# Patient Record
(demographics unavailable — no encounter records)

---

## 2017-08-20 NOTE — REP
Chest x-ray:  Two views.

 

History:  Chest pain.

 

Comparison study:  January 7, 2017.

 

Findings:  EKG monitoring electrodes overlie the chest.  The patient is status

post prior median sternotomy.  Three or four  metallic densities overlie the

heart, on the frontal and lateral radiograph suggestive of intracardiac device.

CT study shows mitral valve replacement.  There is a hazy opacity on today's

chest x-ray overlying the anterior end of the first and second ribs.  This is not

apparent on January 7, 2017 chest x-ray or chest CT.  A new infiltrate cannot be

excluded.  The density is somewhat nodular or rounded.  Recommend followup chest

x-ray and possibly chest CT scanning.  No other infiltrate is seen.

 

Impression:

 

Hazy 4 cm opacity in the left apex.  Infiltrate versus other lesion.  Follow-up

chest x-ray recommended.  Chest CT may be warranted.  Otherwise no active

disease. Prior sternotomy and mitral valve replacement.

 

 

Signed by

Osmel Barraza MD 08/20/2017 08:49 A

## 2017-08-20 NOTE — ECGEPIP
Stationary ECG Study

                           [facility] - ED

                                       

                                       Test Date:    2017

Pat Name:     CJ HUNG        Department:   

Patient ID:   [id number]                 Room:         -

Gender:       F                        Technician:   de

:          1964               Requested By: LEONOR DESHPANDE

Order Number: [id number]     Reading MD:   Maddie Moser

                                 Measurements

Intervals                              Axis          

Rate:         78                       P:            30

AZ:           226                      QRS:          -11

QRSD:         149                      T:            114

QT:           441                                    

QTc:          505                                    

                           Interpretive Statements

SINUS RHYTHM WITH FIRST DEGREE AV BLOCK WITH OCCASIONAL VENTRICULAR PREMATURE

 

COMPLEXES

LEFT BUNDLE BRANCH BLOCK

SIMILAR 22:17

Electronically Signed On 2017 21:34:50 EDT by Maddie Moser

## 2017-08-20 NOTE — ECGEPIP
Stationary ECG Study

                           [facility] - ED

                                       

                                       Test Date:    2017

Pat Name:     CJ HUNG        Department:   

Patient ID:   [id number]                 Room:         -

Gender:       F                        Technician:   de

:          1964               Requested By: LEONOR DESHPANDE

Order Number: [id number]     Reading MD:   Maddie Moser

                                 Measurements

Intervals                              Axis          

Rate:         79                       P:            44

ND:           224                      QRS:          -5

QRSD:         152                      T:            155

QT:           441                                    

QTc:          507                                    

                           Interpretive Statements

SINUS RHYTHM WITH FIRST DEGREE AV BLOCK

LEFT BUNDLE BRANCH BLOCK

SIMILAR 17

Electronically Signed On 2017 21:33:42 EDT by Maddie Moser

## 2017-08-21 NOTE — ED PDOC
Post-Departure Follow-Up


certitied letter sent to pt re formal read of cxr fr fu mlg Lundborg-Gray,Maja MD Aug 21, 2017 13:39

## 2017-10-23 NOTE — ECGEPIP
Stationary ECG Study

                           [facility] - ED

                                       

                                       Test Date:    2017-10-23

Pat Name:     CJ WOLF             Department:   

Patient ID:   [id number]                 Room:         -

Gender:       F                        Technician:   estela APONTEB:          1964               Requested By: LEONOR DESHPANDE

Order Number: [id number]     Reading MD:   Maddie Moser

                                 Measurements

Intervals                              Axis          

Rate:         71                       P:            -67

WI:           165                      QRS:          -12

QRSD:         160                      T:            121

QT:           479                                    

QTc:          521                                    

                           Interpretive Statements

SINUS RHYTHM WITH SINUS ARRHYTHMIA

LEFT BUNDLE BRANCH BLOCK

NO PRIOR FOR COMPARISON

Electronically Signed On 10- 21:19:44 EDT by Maddie Moser

## 2017-10-24 NOTE — REP
V/Q SCAN:

 

Following the intravenous administration of 5.5 mCi of technetium 99m tagged MAA

and the inhalation of 1 mCi of technetium 99m DTPA aerosol, multiple images of

the lungs are obtained in various projections.  There is cardiomegaly.  There is

mild elevation of the right hemidiaphragm.  Subsegmental matching ventilation and

perfusion defects are seen in both lung bases.  No areas of significant V/Q

mismatch are seen.

 

IMPRESSION:

 

Low probability of pulmonary embolism.

 

 

Signed by

Carlton Preston MD 10/25/2017 04:26 P

## 2017-10-24 NOTE — IPN
DATE:  10/24/2017

 

Patient admitted overnight with persistent chest pain, sharp, left-sided,

radiating up to neck, not relieved with nitroglycerin.  On and off for the past

one month or so.  Denies any fevers, chills, coughing, shortness of breath.  Her

baseline morbidly obese.

 

VITAL SIGNS:  Temperature 96.3, pulse 60, respirations 16, blood pressure 194/46,

pulse oximetry 98% on room air.

 

LABORATORY:

WBC 9.3, hemoglobin and hematocrit (H and H) 13.1/39.2, platelets 127.

 

Chemistry:  Sodium 140, potassium 3.6, chloride 105, bicarbonate 25, BUN 25,

creatinine 1.87, magnesium 1.88.

 

Cardiac enzymes negative times three.

 

Ventilation-perfusion (V/Q) scan negative.

 

PHYSICAL EXAMINATION:

GENERAL:  Patient morbidly obese, alert and oriented times three, in no acute

distress.

HEENT:  Normocephalic, atraumatic.

PULMONARY:  Distant breath sounds.  Bilateral clear to auscultation.

CARDIAC:  Regular S1, S2.

ABDOMEN:  Soft, nontender.

EXTREMITIES:   No clubbing, cyanosis or edema.

 

ASSESSMENT AND PLAN:

This is a 53-year-old female patient with underlying medical history of coronary

artery disease with hypertrophic obstructive cardiomyopathy.  Patient has had

stent placement multiple times in the past, as well as septal myotomy, morbid

obesity, hypothyroidism, anxiety, hypertension, depression, restless leg

syndrome, obstructive sleep apnea, not compliant with continuous positive airway

pressure (CPAP).  Patient presented with chest pain on and off for the past

month.

 

PROBLEMS:

1.  Chest pain for the past one month.  Cardiac enzyme negative times three.

Consulted cardiology, Dr. Finley and V/Q scan has been negative.  Chest x-ray

appreciated.  Telemetry monitoring as per Dr. Finley.  There are no cardiac

reasons to keep the patient as inpatient at this point.  Continue aspirin and

Plavix.  Recommend outpatient followup.  Home safety evaluation.  Pain medication

as prescribed.  Continue Imdur.

 

2.  Obstructive sleep apnea.  Patient poorly compliant.  Not using continuous

positive airway pressure (CPAP).  Will monitor closely.  Obstructive sleep apnea

(KELIN) protocol.

 

3.  Depression/anxiety.  Continue current medication.

 

4.  Seasonal allergies.  Continue current medication.

 

5.  Morbid obesity.  Complicating care.

 

6.  Diabetes.  Insulin sliding scale per protocol.

 

7.  Hypothyroidism.  Continue Synthroid.

 

8.  Hypertension.  Continue metoprolol and Imdur.

 

9.  Gastroesophageal reflux disease (GERD).  Continue current medication.

 

10.  Restless leg syndrome.  Continue current medication.

 

11.  Chronic kidney disease (CKD).  Monitor BUN and creatinine.  Currently

improved.

 

12.  Deep venous thrombosis (DVT) prophylaxis.  Heparin subcutaneously.

 

DISPOSITION PLANNING:  Likely discharge over the next 24 hours.  Home safety

evaluation.

## 2017-10-24 NOTE — REP
PA and lateral chest:

 

There are no comparisons.

 

There is cardiomegaly.  There are sternotomy wires.

 

There are no pleural effusions.  There are no infiltrates.  The sean,

mediastinum, and bony thorax are unremarkable.

 

Impression:

 

Cardiomegaly.

 

 

Signed by

Carlton Dejesus MD 10/24/2017 07:28 A

## 2017-10-24 NOTE — ECHO
DATE OF PROCEDURE: 10/23/2017

 

REFERRING PHYSICIAN:  Dr. Edgar Souza in the emergency room.

 

INDICATION"  Chest pain, heart murmur.

 

The patient measures 155 cm and weighs 143 kg.  She has a history of 
hypertrophic

cardiomyopathy, history of mitral valve replacement with bioprosthesis.

 

DIMENSIONS:

IVS 1.6

LV 4.4

LVPW:  1.4

LA 4.9

Aorta 2.8

 

E velocity on mitral inflow 142 cm/sec. A velocity 131 cm/sec.  E prime 3.0

cm/sec.  E prime lateral 6.1 cm/sec.

 

FINDINGS:

The patient is in sinus rhythm.  Quality of the study is very limited

corresponding to patient's body habitus.  Left ventricle is of normal size and

overall normal contractility, I estimate ejection fraction around 60%.  There is

a septal wall motion abnormality, likely related to prior open heart surgery.

I cannot rule out wall motion abnormality involving the apex, but it was poorly

seen.  Right ventricle does not appear enlarged.  Left atrium is severely

enlarged. Right atrium was poorly visualized.  Aortic valve was poorly seen.  It

appears at least mildly sclerotic but mobility is grossly preserved.  There is a

bioprosthesis in mitral position.  The visualization was rather limited.  There

appear to be some mobile echo densities adjacent to the valve in the left

ventricle, which most likely represent remnants of the chordal apparatus.  I

cannot completely rule out that this might be vegetations, but I consider it

unlikely. Tricuspid and pulmonic valves were poorly visualized but no gross

abnormalities were seen.  No pericardial effusion is noted.  Inferior vena cava

is dilated but has some collapse with respiration, indicative of likely elevated

central venous pressure.  Aortic root is normal, aortic arch and abdominal aorta

were not seen.

 

Doppler interrogation of aortic valve reveals trace insufficiency and trivial

stenosis, mean gradient was only 7 mmHg.  Peak gradient across the mitral

bioprosthesis is 9 and mean 5, which are numbers that are minimally elevated.  I

do not appreciate any insufficiency of the valve.  There is trace tricuspid

insufficiency, but unfortunately quality of TR jet was not sufficient to

adequately estimate pulmonary artery pressure.  Pulmonic valve also appears

grossly competent based on limited views.

 

Mitral inflow pattern and tissue Doppler imaging of mitral annulus reveal at

least grade 2 diastolic dysfunction.  Unfortunately, the accuracy of this is

reduced by presence of mitral bioprosthesis and mild functional mitral stenosis.

 

CONCLUSION:

1.  Study is of limited technical quality.

 

2.  Moderate left ventricular hypertrophy with preserved LV systolic function.

Septal wall motion abnormality probably related to prior open heart surgery.

Cannot rule out apical wall motion abnormality.  Overall LV systolic function is

preserved.

 

3.  Bioprosthesis in mitral position with no insufficiency and mildly increased

gradient (mean gradient 5 mmHg), cannot completely rule out presence of

vegetation, but it seems unlikely.

 

4.  Elevated central venous pressure.

 

5.  Unable to estimate pulmonary artery pressure.

 

6.  No LVOT gradient.

 

7.  Aortic sclerosis with trivial stenosis and trivial insufficiency.

 

COMMENTS:  SBE prophylaxis is recommended.

MTDD

## 2017-10-26 NOTE — DSES
DATE OF ADMISSION:    10/23/2017

DATE OF DISCHARGE:   10/25/2017

 

PRIMARY CARE PROVIDER:  Vladimir Winkler

 

CARDIOLOGY:  Dr. Concepcion and Dr. Finley

 

FINAL DIAGNOSES:

1.  Chest pain.

2.  Obstructive sleep apnea.

3.  Depression.

4.  Anxiety.

5.  Seasonal allergies.

6.  Morbid obesity.

7.  Diabetes.

8.  Hypothyroidism.

9.  Hypertension.

10.  Gastroesophageal reflux disease (GERD).

11.  Restless legs.

12.  Chronic kidney disease (CKD).

 

HISTORY OF PRESENT ILLNESS:   This is a 53-year-old female with underlying

medical history of coronary arterial disease with hypertrophic obstructive

cardiomyopathy, had stent placement multiple times in the past and also with

septal myotomy twice in the past, morbid obesity, hypothyroidism, anxiety,

hypertension, depression, restless legs, obstructive sleep apnea, noncompliant

with CPAP, presented with chest pain on and off for the past month, as per

patient was 8 out of 10.  Denies any diaphoresis.  No alleviating or exacerbating

factor.  Not relieved with nitro.  Denies any shortness of breath, nausea or

vomiting, fevers or chills.  Echo was done in the emergency room and was

negative.

 

HOSPITAL COURSE:  The patient was admitted to the hospital.  Echo was done in the

emergency room.  Case was discussed with Dr. Finley.  V/Q scan was done, which

showed negative for pulmonary embolism.  The patient's pain medication was given.

The patient currently feels improved.  Cardiac enzymes were followed.  EKG was

appreciated.  The patient's pain seems to be improved but still does not have a

reason.  It is determined that the patient's pain is noncardiac and FIDENCIO workup

has also been negative.  Case discussed with Dr. Finley.  Discharge the patient

for outpatient further workup.  The patient is currently comfortable and

tolerating oral with pain well controlled. Passed home safety evaluation and

ready for discharge for further care as an outpatient.

 

Temperature 96.5, pulse 61, respirations 19, blood pressure 115/60, pulse

oximetry 97% on room air.

 

GENERAL:  The patient is morbidly obese, alert and oriented times three.  In no

acute distress.

HEENT:  Normocephalic, atraumatic.

PULMONARY:  Decreased breath sounds bilaterally.

CARDIAC:  Regular.  S1, S2.

ABDOMEN:  Soft, nontender.

EXTREMITIES:  No clubbing, cyanosis or edema.

 

LABORATORY DATA:

WBC 7.6, hemoglobin and hematocrit 12.6/39, platelets 131.

 

Chemistry:  Sodium 141, potassium 4.0, chloride 106, bicarbonate 31, BUN 23,

creatinine 1.83.

 

DISCHARGE MEDICATIONS:

The patient's home medications of:

- Ventolin inhaler four times a day as needed

- albuterol nebulizer four times a day as needed

- aspirin 81 mg by mouth daily

- BuSpar 7.5 mg by mouth twice a day

- Zyrtec 10 mg by mouth daily

- Plavix 75 mg by mouth daily

- fish oil 1000 mg by mouth daily

- fluoxetine 40 mg by mouth daily

- folic acid 1 mg by mouth daily

- Lasix 20 mg by mouth twice a day

- isosorbide mononitrate 60 mg by mouth at night

- Synthroid 25 mcg by mouth daily

- lorazepam 1 mg by mouth at night and 1 mg by mouth daily as needed

- metoprolol succinate 50 mg by mouth daily

- multivitamin one tablet by mouth daily

- sublingual nitro 0.4 mg sublingually as needed

- Protonix 40 mg by mouth daily

- potassium chloride 10 mEq by mouth daily

- Lyrica 50 mg by mouth twice a day

- Ranexa 1000 mg by mouth twice a day

- Requip 2 mg by mouth at night

 

DISCHARGE INSTRUCTIONS:   The patient is instructed to followup with primary care

provider in 7 days, followup with cardiologist in 7 days.  Return to the hospital

if symptoms worsen.

## 2017-10-26 NOTE — HPE
DATE OF ADMISSION: 10/23/2017

 

TIME PATIENT WAS SEEN: Was at 2 a.m.

 

PATIENT'S PRIMARY CARE PROVIDER: Is Dr. Lucho Zambrano at [place], [place].

 

PATIENT'S CARDIOLOGIST: Dr. Concepcion

 

NEPHROLOGIST: Dr. Salas

 

CHIEF COMPLAINT: Chest pain radiating to the left arm and left jaw.

 

HISTORY OF PRESENT ILLNESS (HPI): 53-year-old female with past medical history 
of

cardiomyopathy status post septal myectomy and bioprosthetic mitral valve

replacement, diastolic heart failure grade 2 (ejection fraction was 60% in the

past), restless leg syndrome, type 2 diabetes (diet controlled), endometrial

cancer status post hysterectomy, subxiphoid ventral hernia, obstructive sleep

apnea (noncompliant with CPAP), irritable bowel syndrome, and also asthma,

chronic kidney disease stage IV, left bundle branch block, coronary artery

disease status post stents times four, hypertension, morbid obesity and diabetic

neuropathy, gastrointestinal reflux disease (GERD), hiatal hernia, and

hypothyroidism, presented left-sided chest pain. Per patient, it was needle-like

and lasting for hours, radiating to the left arm and left jaw. The pain subsides

usually with narcotics. Per patient, this pain has been going on since

10/03/2017.

 

Upon reviewing record, we actually saw this patient back on 2017 for the

same complaints. Patient had negative workup back then. This time, emergency 
room

has ordered a stat echocardiogram. Was read by Dr. Finley. Per emergency room

physician, Dr. Finley stated patient has mild mitral regurgitation; otherwise, 
no

acute abnormalities. In addition, per emergency room, patient was a frequent

flyer at [facility].

 

Upon interviewing patient, she also stated that her pain gets better every time

with narcotics and she was requesting Dilaudid or fentanyl. Otherwise, patient

denies any abdominal pains, any diarrhea, constipation. Patient does admit to

feeling nauseous. Denies any vomiting. Denies any problem with urination. Denies

any fever or chills. Admits to shortness of breath when patient was having chest

pain.

 

ALLERGIES: Patient is allergic to;

- DOXYCYCLINE (gives her hives)

- KEFLEX (gives her rash)

- LIPITOR (gives her hives)

- MORPHINE (gives her hives; however, last time when she was here in January, 
she

said it makes her blood vessels green)

- NEXIUM (makes her have hives)

- DONNATAL (makes her tongue swell)

 

HOME MEDICATIONS: Including:

- Ventolin two puffs inhalation four times a day as needed

- aspirin 81 mg one tablet by mouth daily

- buspirone 7.5 mg one tablet by mouth twice a day

- cetirizine 10 mg by mouth daily

- clopidogrel 75 mg one tablet by mouth daily

- fish oil 1000 mg one tablet by mouth daily

- fluoxetine 40 mg one tablet by mouth daily

- folic acid one tablet by mouth daily

- furosemide 20 mg one tablet by mouth twice a day

- isosorbide mononitrate 60 mg one tablet by mouth nightly

- Synthroid 25 mcg one tablet by mouth daily

- lorazepam 1 mg one tablet by mouth nightly

- lorazepam 1 mg by mouth daily as needed

- metoprolol succinate 50 mg one tablet by mouth daily

- multivitamin one tablet by mouth daily

- nitroglycerin 0.4 mg sublingual as needed

- pantoprazole 40 mg one tablet by mouth daily

- potassium chloride 10 mg one tablet by mouth daily

- Lyrica 50 mg one tablet by mouth twice a day

- Ranexa 1000 mg one tablet by mouth twice a day

- ropinirole 2 mg one tablet by mouth nightly

 

PAST MEDICAL HISTORY: Includin. Hypertrophic obstructive cardiomyopathy status post two septal myectomies,

also mitral valve replacement, initially mechanical. Had endocarditis; therefore
,

it was replaced with a bioprosthetic back in .

2. Coronary artery disease status post stents times four. Last one was in ;

however, back in January, patient stated she only had two stents.

3. Diastolic heart failure with ejection fraction of 60-65%.

4. Hypertensive heart disease.

5. Thrombosis per patient in mitral valve after hysterectomy for endometrial

cancer. At that time, patient also had endocarditis and had the mitral valve

repaired.

6. Hypothyroidism.

7. Restless leg syndrome.

8. Seasonal allergies.

9. Type 2 diabetes. Not on any medications.

10. Endometrial cancer status post hysterectomy.

11. Morbid obesity.

12. Anxiety/depression.

13. Obstructive sleep apnea. Noncompliant with continuous positive airway

pressure (CPAP).

14. Asthma.

15. Hypertension.

16. Irritable bowel syndrome.

17. First-degree atrioventricular (AV) block with left bundle branch block.

18. Elevated troponin at baseline.

19. Chronic kidney disease stage IV.

20. Subxiphoid ventral hernia.

 

PAST SURGICAL HISTORY:

1. Patient had mitral valve replaced times two. Most recent one was 
bioprosthetic

in .

2. Posterior tibial tendon transplant.

3. Septal myectomy times two.

4. Adenoidectomy and tonsillectomy.

5. Hysterectomy due to endometrial cancer.

6. Cardiac stent times four, last one in 2016. Last time when she was here, she

only stated she had two stents.

7. Tracheostomy.

8. Hemorrhoid repair.

9. Cholecystectomy.

10. Bilateral ankle repair.

11. Tonsillectomy.

 

SOCIAL HISTORY: Patient denies any smoking; stated actually quit 10 years ago.

Used to smoke one pack per day for at least 20 years. Admits to occasional

drinking; last one was last Saturday where she had beer. Denies any recreational

drug use.

 

FAMILY HISTORY: Denies. Patient was adopted.

 

REVIEW OF SYSTEMS:

GENERAL: Patient denies any recent traveling, any sick contacts, any weight

changes, any fever or chills.

HEENT: Denies any changes with vision, smell, hearing, or taste.

CARDIOVASCULAR: Admits to chest pain, needle-like, radiating to the arms and 
jaw.

Admits to associated shortness of breath. Denies any worsening with exertion or

with breathing.

PULMONARY: Admits to some trouble breathing with chest pain. Patient also was

noncompliant with CPAP at home.

GASTROINTESTINAL (GI): Denies any abdominal pains. Admits to nausea, which

appears to be chronic. Denies any vomiting, any diarrhea or constipation, any

blood in the stool.

GENITOURINARY (): Denies any problem with urination, any blood in urine,

dysuria, burning on urination.

MUSCULOSKELETAL: Denies chronic pain and also diabetic neuropathy.

ENDOCRINE: Denies any polydipsia, polyuria. Patient does have type 2 diabetes,

diet controlled. Denies any heat or cold intolerance.

HEMATOLOGY/ONCOLOGY: Admits to a bruising spot on the abdomen. Denies it was 
from

heparin shot. Denies any weight changes, any other bruising anywhere. Denies any

bleeding anywhere.

SKIN: Denies any abnormal rash, ulcerations. Does admit to an ecchymosis region

on the surface of the abdomen.

PSYCHIATRIC: Currently denies any depression or anxiety.

NEUROLOGIC: Denies any weakness on any side of her body. Admits to numbness and

tingling in the extremities at baseline from neuropathy.

 

PHYSICAL EXAM:

VITAL SIGNS: Temperature 97.9, pulse 67, blood pressure 157/74, respiratory rate

18, oxygen saturation 98% on room air.

GENERAL: Patient is a severely morbidly obese middle-aged female who looks older

than her age, who was alert, awake, oriented times three, does not appear to be

in distress, lying comfortably in bed with head elevated at 40 degrees.

HEENT: Normocephalic, atraumatic. Extraocular motor intact. Mucous moist.

NECK: Supple. No neck lymphadenopathy.

CARDIOVASCULAR: Regular rate, rhythm. There were at least 3/6 systolic heart

murmurs. No gallops or rub heard.

LUNGS: Clear to auscultate bilaterally. No wheeze, rales, rhonchi.

ABDOMEN: Positive bowel sounds. Obese, soft, nontender, nondistended. No

peritoneal signs. There is an ecchymosis spot on the right abdomen, which 
appears

to be very superficial, not indurated, and does not jim.

EXTREMITIES: No edema, clubbing, or cyanosis. Pulses were intact bilaterally.

Muscle strength was 5/5 in bilateral upper and lower extremities.

 

LABS: WBC 11, hemoglobin 14.1, hematocrit 42.7 with platelet count of 164. MCV

94.5.

 

Sodium 140, potassium 4.3, chloride 104, bicarbonate 29, BUN 24, creatinine 2.23
,

GFR 24.5, fasting glucose 102, calcium 8.5. Magnesium 1.8. CK 45, CK-MB 1,

troponin 0.1.

 

Coagulation shows PT 13.5, INR 1.02, PTT 25.4, D-dimer was 605.2.

 

Patient had a portable chest x-ray done in the emergency room. Result is

currently pending.

 

Patient also had V/Q scan ordered. It has not been done yet.

 

Patient had EKG. Shows sinus rhythm with left bundle branch block, similar to

previous EKG.

 

ASSESSMENT AND PLAN: 53-year-old female with multiple comorbidities, most

significantly history of angina, coronary artery disease status post four stents
,

history of heart failure, history of hypertrophic obstructive cardiomyopathy

status post myectomy and bioprosthetic valve replacement, hypertension, morbid

obesity, presented with:

 

1. Chest pain. Patient had similar chest pain back in January and had negative

workup. Chest pain appears to be needle-like and was not pleuritic. Patient's

cardiac markers have been negative. EKG did not show any changes from before,

however, shows 600 and it was elevated; therefore, there is slight suspicion for

pulmonary embolism. A V/Q scan has been ordered, and will followup with the

result; however, at this point, patient does not have any desaturation, patient

is hemodynamically stable, chest pain does not seem to be pleuritic, and patient

did have similar chest pain back in January and likely before, as well. Patient

did receive fentanyl in the emergency room. Per patient, she would normally have

fentanyl or Dilaudid to help with her pain. Due to she is allergic to morphine

and other pain medication does not work her, will continue Dilaudid 1 mg by 
mouth

every 8 hours as needed. Continue to followup a cardiac marker in the morning 
and

the V/Q scan result. In addition, Dr. Finley has been consulted from emergency

room and has agreed to see patient in the morning. Will followup his

recommendation.

 

2. Leukocytosis with WBC of 11. Currently is baseline. Etiology unclear. Will

followup. Possibly due to stress.

 

3. History of coronary artery disease with four stents. Continue aspirin and

Plavix.

 

4. Bioprosthetic mitral valve. Continue to monitor. Continue aspirin and Plavix.

 

5. Chronic kidney disease stage IV. Currently, patient is at baseline. Continue

to monitor.

 

6. Hypertension. Continue beta-blocker, isosorbide mononitrate.

 

7. Diastolic heart failure. On Lasix. Continue to monitor.

 

8. Type 2 diabetes, diet controlled. Continue insulin sliding scale and

fingersticks.

 

9. Anxiety. Continue home medications.

 

10. Restless leg syndrome. Continue home medication.

 

11. Neuropathic pain. Continue home medication.

 

12. Gastrointestinal reflux disease, hiatal hernia, and xiphoid hernia. Continue

home medication, home Protonix.

 

13. History of angina. Continue home Ranexa.

 

14. History of asthma. Continue as-needed nebulizer.

 

15. Hypothyroidism. Continue home Synthroid.

 

16. Deep venous thrombosis (DVT) prophylaxis with heparin 5000 units

subcutaneously every 8 hours.

 

17. Fluid, electrolytes, and nutrition. Patient currently does not need fluid.

Electrolytes were at goal. Patient may have a carbohydrate-consistent diet with 
2

grams of sodium and a low-fat, low-residue diet.

 

DISPOSITION: Patient has chest pain similar to her previous chest pains. However
,

patient does have elevated D-dimer. V/Q scan has been ordered. Will followup. In

addition, Dr. Finley from cardiology has been consulted. We will follow his

recommendation.

 

Patient has been discussed with attending doctor, Dr. Harper.

 

My preceptor for this patient encounter was Dr. Alex Harper. The preceptor 
was

physically present in the building during the encounter and was fully

available. As needed, all aspects of the patient interview, examination, medical

decision making process, and medical care plan development were reviewed and

approved by the preceptor. The preceptor is aware and concurs with the plan as

stated in the body of this note and will attest to such by his/her cosignature.

RAJAN